# Patient Record
Sex: MALE | Race: WHITE | NOT HISPANIC OR LATINO | Employment: FULL TIME | URBAN - METROPOLITAN AREA
[De-identification: names, ages, dates, MRNs, and addresses within clinical notes are randomized per-mention and may not be internally consistent; named-entity substitution may affect disease eponyms.]

---

## 2021-11-18 ENCOUNTER — OFFICE VISIT (OUTPATIENT)
Dept: NEUROSURGERY | Facility: CLINIC | Age: 41
End: 2021-11-18

## 2021-11-18 VITALS
BODY MASS INDEX: 30.77 KG/M2 | RESPIRATION RATE: 18 BRPM | OXYGEN SATURATION: 96 % | SYSTOLIC BLOOD PRESSURE: 152 MMHG | HEART RATE: 87 BPM | DIASTOLIC BLOOD PRESSURE: 103 MMHG | WEIGHT: 203 LBS | HEIGHT: 68 IN

## 2021-11-18 DIAGNOSIS — M54.42 ACUTE BILATERAL LOW BACK PAIN WITH BILATERAL SCIATICA: ICD-10-CM

## 2021-11-18 DIAGNOSIS — M51.36 DDD (DEGENERATIVE DISC DISEASE), LUMBAR: Primary | ICD-10-CM

## 2021-11-18 DIAGNOSIS — M54.41 ACUTE BILATERAL LOW BACK PAIN WITH BILATERAL SCIATICA: ICD-10-CM

## 2021-11-18 PROCEDURE — 99204 OFFICE O/P NEW MOD 45 MIN: CPT | Performed by: NEUROLOGICAL SURGERY

## 2021-11-18 NOTE — PATIENT INSTRUCTIONS
Purchase an inversion table.   Visit a massage therapist.   Purchase tumeric supplement to help with inflammation.   Referral placed to see Dr. Hackett to discuss cortisone injections.   Call the office in the interim if symptoms worsen or fail to improve.   [phone].   Complete lumbar x-ray prior to follow up appointment with Dr. Walden in 6 months.

## 2021-11-18 NOTE — PROGRESS NOTES
NAME: NEISHA ADAME   DOS: 2021  : 1980  PCP: Jayjay Peña MD    Chief Complaint:    Chief Complaint   Patient presents with   • Back Pain       History of Present Illness:  41 y.o. male   I saw this very pleasant 41-year-old male in neurosurgical consultation.  He presents with a history of axial back pain.  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  He reports years of back pain he has occasional left-sided sciatic leg pain but the majority of his pain is in his axial and left hip occasionally he will get bilateral hip issues.  He denies any bowel bladder incontinence issues he has pain with flexion it is alleviated by extension he denies any fever chills recent infections dental work or risk factors for infection he has 3 small children    PMHX  Allergies:  No Known Allergies  Medications  No current outpatient medications on file.  Past Medical History:  Past Medical History:   Diagnosis Date   • Low back pain      Past Surgical History:  History reviewed. No pertinent surgical history.  Social Hx:  Social History     Tobacco Use   • Smoking status: Never Smoker   • Smokeless tobacco: Never Used   Vaping Use   • Vaping Use: Never used   Substance Use Topics   • Alcohol use: Yes     Alcohol/week: 4.0 standard drinks     Types: 4 Cans of beer per week   • Drug use: Never     Family Hx:  Family History   Problem Relation Age of Onset   • COPD Father    • Cancer Paternal Grandfather      Review of Systems:        Review of Systems   Constitutional: Negative for activity change, appetite change, chills, diaphoresis, fatigue, fever and unexpected weight change.   HENT: Negative for congestion, dental problem, drooling, ear discharge, ear pain, facial swelling, hearing loss, mouth sores, nosebleeds, postnasal drip, rhinorrhea, sinus pressure, sinus pain, sneezing, sore throat, tinnitus, trouble swallowing and voice change.    Eyes: Negative for photophobia, pain, discharge, redness,  itching and visual disturbance.   Respiratory: Negative for apnea, cough, choking, chest tightness, shortness of breath, wheezing and stridor.    Cardiovascular: Negative for chest pain, palpitations and leg swelling.   Gastrointestinal: Negative for abdominal distention, abdominal pain, anal bleeding, blood in stool, constipation, diarrhea, nausea, rectal pain and vomiting.   Endocrine: Negative for cold intolerance, heat intolerance, polydipsia, polyphagia and polyuria.   Genitourinary: Negative for decreased urine volume, difficulty urinating, dysuria, enuresis, flank pain, frequency, genital sores, hematuria and urgency.   Musculoskeletal: Positive for back pain. Negative for arthralgias, gait problem, joint swelling, myalgias, neck pain and neck stiffness.   Skin: Negative for color change, pallor, rash and wound.   Allergic/Immunologic: Negative for environmental allergies, food allergies and immunocompromised state.   Neurological: Negative for dizziness, tremors, seizures, syncope, facial asymmetry, speech difficulty, weakness, light-headedness, numbness and headaches.   Hematological: Negative for adenopathy. Does not bruise/bleed easily.   Psychiatric/Behavioral: Negative for agitation, behavioral problems, confusion, decreased concentration, dysphoric mood, hallucinations, self-injury, sleep disturbance and suicidal ideas. The patient is not nervous/anxious and is not hyperactive.    I have reviewed this note template and all pertinent parts of the review of systems social, family history, surgical history and medication list        Physical Examination:  Vitals:    11/18/21 0914   BP: (!) 152/103   Pulse: 87   Resp: 18   SpO2: 96%      General Appearance:   Well developed, well nourished, well groomed, alert, and cooperative.  Neurological examination:  Neurologic Exam  Vital signs were reviewed and documented in the chart  Patient appeared in good neurologic function with normal comprehension fluent  speech  Mood and affect are normal  Sense of smell deferred    Covid mask left in place  Right earlobe is posttraumatic  Muscle bulk and tone normal  5 out of 5 strength no motor drift  Gait normal intact  Negative Romberg  No clonus long tract signs or myelopathy    Reflexes symmetric trace may be decreased left ankle reflex  No edema noted and extremities skin appears normal    Straight leg raise sign absent  No signs of intrinsic hip dysfunction  Back is without any lesions or abnormality  Feet are warm and well perfused  Pain with flexion absent in extension      Review of Imaging/DATA:  MRI shows isolated Modic endplate changes with acute and chronic inflammatory type I changes, consistent with isolated degenerative disc  Diagnoses/Plan:    Mr. Rodarte is a 41 y.o. male   Isolated degenerative disc disease L5-S1 and is well compensated gentleman he has occasional sciatic leg pain plan will be    1.  Counseled on physical activity-encourage meditation coping strategies as well as inversion table    2.  Talked about vitamin supplementation and anti-inflammatory diets    3.  We will refer to interventional pain management consideration of lumbar epidural steroid block and interventional therapies    4.  Talk to him about anterior versus posterior surgery artificial disc replacement etc. he is a candidate for posterior lumbar versus anterior lumbar fusion.  I explained the risk benefits and expected outcome of major elective surgery for their problem, complications from approach, and infection, the risk of neurologic implications after surgery as well as need for repeat surgeries and most importantly failure to achieve quality of life improvement from the surgery to the patient.    5.  Follow-up 3 to 6 months lumbar flexion-extension film-explained signs and symptoms look for to necessitate a follow-up very low risk that this would represent some sort of spondyloarthropathy and/or inflammatory process.

## 2022-01-02 PROBLEM — M43.16 SPONDYLOLISTHESIS, LUMBAR REGION: Status: ACTIVE | Noted: 2022-01-02

## 2022-01-02 PROBLEM — M51.37 DEGENERATION OF LUMBAR OR LUMBOSACRAL INTERVERTEBRAL DISC: Status: ACTIVE | Noted: 2022-01-02

## 2022-01-03 ENCOUNTER — TELEPHONE (OUTPATIENT)
Dept: PAIN MEDICINE | Facility: CLINIC | Age: 42
End: 2022-01-03

## 2022-01-03 NOTE — TELEPHONE ENCOUNTER
Caller: Houston Rodarte    Relationship to patient: Self    Best call back number: [phone]    Date of exposure: UNKNOWN    Date of positive COVID19 test:  01/03/22    Date if possible COVID19 exposure: 12/31/22    COVID19 symptoms: CONGESTION/RUNNY NOSE, COUGH    Date of initial quarantine: 01/03/22    Additional information or concerns: JUST WANTED TO MAKE  AWARE OF THIS. RESCHEDULED PATIENT FOR ONE OF THE SOONEST AVAILABLE APPOINTMENTS AT LEAST 14 DAYS OUT. PATIENT IS SCHEDULED FOR  02/01/22 AT 9:20AM.